# Patient Record
Sex: MALE | URBAN - METROPOLITAN AREA
[De-identification: names, ages, dates, MRNs, and addresses within clinical notes are randomized per-mention and may not be internally consistent; named-entity substitution may affect disease eponyms.]

---
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
## 2019-01-03 ENCOUNTER — TRANSFERRED RECORDS (OUTPATIENT)
Dept: HEALTH INFORMATION MANAGEMENT | Facility: CLINIC | Age: 19
End: 2019-01-03

## 2019-01-03 LAB
BASOPHILS # BLD AUTO: 0 10*3/UL
BASOPHILS # BLD AUTO: 0 10*3/UL
BASOPHILS NFR BLD AUTO: 1 %
BASOPHILS NFR BLD AUTO: 1 %
EOSINOPHIL # BLD AUTO: 0.1 10*3/UL
EOSINOPHIL # BLD AUTO: 0.1 10*3/UL
EOSINOPHIL NFR BLD AUTO: 2 %
EOSINOPHIL NFR BLD AUTO: 2 %
ERYTHROCYTE [DISTWIDTH] IN BLOOD BY AUTOMATED COUNT: 12.6 %
ERYTHROCYTE [DISTWIDTH] IN BLOOD BY AUTOMATED COUNT: 12.6 %
HCT VFR BLD AUTO: 44.4 %
HCT VFR BLD AUTO: 44.4 %
HEMOGLOBIN: 15.3 G/DL
HEMOGLOBIN: 15.3 G/DL (ref 13.3–17.7)
LYMPHOCYTES # BLD AUTO: 0.7 10*3/UL
LYMPHOCYTES # BLD AUTO: 0.7 10*3/UL (ref 0.76–4)
LYMPHOCYTES NFR BLD AUTO: 19 %
LYMPHOCYTES NFR BLD AUTO: 19 %
MCH RBC QN AUTO: 35.6 PG
MCH RBC QN AUTO: 35.6 PG (ref 27–34)
MCHC RBC AUTO-ENTMCNC: 34.5 G/DL
MCHC RBC AUTO-ENTMCNC: 34.5 G/DL
MCV RBC AUTO: 103 FL
MCV RBC AUTO: 103 FL (ref 80–98)
MONOCYTES # BLD AUTO: 0.4 10*3/UL
MONOCYTES # BLD AUTO: 0.4 10*3/UL
MONOCYTES NFR BLD AUTO: 10 %
MONOCYTES NFR BLD AUTO: 10 %
NEUTROPHILS # BLD AUTO: 2.6 10*3/UL
NEUTROPHILS # BLD AUTO: 2.6 10*3/UL
NEUTROPHILS NFR BLD AUTO: 69 %
NEUTROPHILS NFR BLD AUTO: 69 %
PLATELET COUNT - QUEST: 83 10^9/L (ref 150–420)
PLATELET COUNT - QUEST: 83 10^9/L (ref 150–450)
PMV BLD: 8.3 FL
RBC # BLD AUTO: 4.3 10^12/L
RBC # BLD AUTO: 4.3 10^12/L
WBC # BLD AUTO: 3.9 10^9/L
WBC # BLD AUTO: 3.9 10^9/L

## 2019-03-13 ENCOUNTER — TRANSFERRED RECORDS (OUTPATIENT)
Dept: HEALTH INFORMATION MANAGEMENT | Facility: CLINIC | Age: 19
End: 2019-03-13

## 2019-03-13 LAB
BASOPHILS # BLD AUTO: 0 THOUS/UL
BASOPHILS NFR BLD AUTO: 0.2 %
EOSINOPHIL # BLD AUTO: 0.1 THOUS/UL
EOSINOPHIL NFR BLD AUTO: 2.5 %
ERYTHROCYTE [DISTWIDTH] IN BLOOD BY AUTOMATED COUNT: 12.1 %
HCT VFR BLD AUTO: 44 %
HEMOGLOBIN: 15.1 G/DL
IMMATURE GRANS (ABS): 0 THOUS/UL
IMMATURE GRANULOCYTES: 0 %
LYMPHOCYTES # BLD AUTO: 1 THOUS/UL
LYMPHOCYTES NFR BLD AUTO: 24.5 %
MCH RBC QN AUTO: 34.9 PG (ref 27–33)
MCHC RBC AUTO-ENTMCNC: 34.3 G/DL
MCV RBC AUTO: 101.6 FL (ref 81–95)
MONOCYTES # BLD AUTO: 0.3 THOUS/UL
MONOCYTES NFR BLD AUTO: 8.3 %
NEUTROPHILS # BLD AUTO: 2.6 THOUS/UL
NEUTROPHILS NFR BLD AUTO: 64.5 %
NUCLEATED RBC/100 WBC: 0 /100 WBC
NUCLEATED RBCS: 0 %
PLATELET COUNT - QUEST: 84 10^9/L (ref 150–450)
PMV BLD: 10.9 FL (ref 7.4–10.4)
RBC # BLD AUTO: 4.33 10^12/L
WBC # BLD AUTO: 4.1 10^9/L

## 2020-03-11 ENCOUNTER — HEALTH MAINTENANCE LETTER (OUTPATIENT)
Age: 20
End: 2020-03-11

## 2020-04-24 ENCOUNTER — MEDICAL CORRESPONDENCE (OUTPATIENT)
Dept: HEALTH INFORMATION MANAGEMENT | Facility: CLINIC | Age: 20
End: 2020-04-24

## 2020-04-27 DIAGNOSIS — Z00.00 ANNUAL PHYSICAL EXAM: ICD-10-CM

## 2020-04-27 DIAGNOSIS — R79.89 LOW VITAMIN D LEVEL: ICD-10-CM

## 2020-04-27 DIAGNOSIS — R73.09 ELEVATED GLUCOSE: Primary | ICD-10-CM

## 2021-01-03 ENCOUNTER — HEALTH MAINTENANCE LETTER (OUTPATIENT)
Age: 21
End: 2021-01-03

## 2021-04-25 ENCOUNTER — HEALTH MAINTENANCE LETTER (OUTPATIENT)
Age: 21
End: 2021-04-25

## 2021-06-14 ENCOUNTER — TRANSFERRED RECORDS (OUTPATIENT)
Dept: HEALTH INFORMATION MANAGEMENT | Facility: CLINIC | Age: 21
End: 2021-06-14

## 2021-10-10 ENCOUNTER — HEALTH MAINTENANCE LETTER (OUTPATIENT)
Age: 21
End: 2021-10-10

## 2022-05-21 ENCOUNTER — HEALTH MAINTENANCE LETTER (OUTPATIENT)
Age: 22
End: 2022-05-21

## 2022-09-18 ENCOUNTER — HEALTH MAINTENANCE LETTER (OUTPATIENT)
Age: 22
End: 2022-09-18

## 2023-06-04 ENCOUNTER — HEALTH MAINTENANCE LETTER (OUTPATIENT)
Age: 23
End: 2023-06-04